# Patient Record
Sex: FEMALE | Race: WHITE | HISPANIC OR LATINO | Employment: UNEMPLOYED | URBAN - METROPOLITAN AREA
[De-identification: names, ages, dates, MRNs, and addresses within clinical notes are randomized per-mention and may not be internally consistent; named-entity substitution may affect disease eponyms.]

---

## 2024-10-24 DIAGNOSIS — S99.911A INJURY OF RIGHT ANKLE: Primary | ICD-10-CM

## 2024-11-22 ENCOUNTER — OFFICE VISIT (OUTPATIENT)
Dept: ORTHOPEDICS | Facility: CLINIC | Age: 24
End: 2024-11-22
Payer: MEDICAID

## 2024-11-22 ENCOUNTER — HOSPITAL ENCOUNTER (OUTPATIENT)
Dept: RADIOLOGY | Facility: HOSPITAL | Age: 24
Discharge: HOME | End: 2024-11-22
Payer: MEDICAID

## 2024-11-22 VITALS
TEMPERATURE: 98 F | BODY MASS INDEX: 34.58 KG/M2 | OXYGEN SATURATION: 97 % | RESPIRATION RATE: 18 BRPM | WEIGHT: 215.19 LBS | HEIGHT: 66 IN | DIASTOLIC BLOOD PRESSURE: 68 MMHG | SYSTOLIC BLOOD PRESSURE: 103 MMHG | HEART RATE: 90 BPM

## 2024-11-22 DIAGNOSIS — S99.911A INJURY OF RIGHT ANKLE: ICD-10-CM

## 2024-11-22 DIAGNOSIS — M76.72 PERONEAL TENDINITIS OF LEFT LOWER EXTREMITY: ICD-10-CM

## 2024-11-22 DIAGNOSIS — S93.491A SPRAIN OF ANTERIOR TALOFIBULAR LIGAMENT OF RIGHT ANKLE, INITIAL ENCOUNTER: Primary | ICD-10-CM

## 2024-11-22 PROCEDURE — 99213 OFFICE O/P EST LOW 20 MIN: CPT | Mod: PBBFAC,25

## 2024-11-22 PROCEDURE — 73610 X-RAY EXAM OF ANKLE: CPT | Mod: TC,RT

## 2024-11-22 NOTE — PROGRESS NOTES
"Subjective:    Patient ID: Jay Ignacio is a 24 y.o. female  who presented to [facility] for new visit..      Chief Complaint: Pain of the Right Ankle (Right ankle pain, patient states past 2 days her ankle has been in severe pain. )      History of Present Illness:  Jay Ignacio  presented today with right ankle pain for the past 3 month with increased pain over the last few days. Pain is located inferior to the lateral malleolus, and rated 3/10 at rest, and 10/10 with activity if she steps on it incorrectly. Quality of pain is described as Sharp, Aching, and Throbbing.  Inciting event: injured while running but no specific directional changes, noted the pain after running a short distance . Pain is aggravated by walking/running. She notes that she did do a dance class a few days ago so that may be contributing to the increase pain the last few days. Patient has had no prior ankle problems. Evaluation to date: plain films. Treatment to date: avoidance of activity, bracing, and oral analgesics. Patient was evaluated by her PCP and was in a boot for about 2.5 months with improvement of pain, once out of the boot the pain began again. Expectations for today's visit includes further evaluation.  Occupation: student. PCP is AMBROCIO Mclean.     Ankle/Foot Review of Systems:  Swelling?  no  Instability?  no  Mechanical sx?  no  <30 min AM stiffness? no  Limited ROM? no  Fever/Chills? no       Objective:      Physical Exam:    /68 (BP Location: Right arm, Patient Position: Sitting)   Pulse 90   Temp 98.4 °F (36.9 °C) (Oral)   Resp 18   Ht 5' 6" (1.676 m)   Wt 97.6 kg (215 lb 2.7 oz)   SpO2 97%   BMI 34.73 kg/m²     Appearance:  Normal gait/station  FWB    Soft tissue swelling: Left: no Right: no  Effusion: Left:  Negative Right: Negative  Erythema: Left no Right: no  Ecchymosis: Left: no Right: no  Atrophy: Left: no Right: " no    Palpation:  Ankle/Foot Tenderness: Left: non tender Right:  distal  lateral  malleolus.    Range of motion:  Ankle dorsiflexion (20 degrees):     Left: 20 Right: 20  Ankle Plantar flexion (50 degrees): Left 50 Right: 50  Subtalar inversion (5 degrees): Left: 5 Right 5  Subtalar eversion (5 degrees):  Left: 5 Right 5  Transverse/midtarsal: adduction (20 degrees): Left: 20 Right: 20  Transverse/midtarsal abduction (10 degrees): Left: 10 Right: 10    Strength:  Foot inversion/dorsiflexion (Deep Peroneal N. L4):Left: 5/5  Pain: no Right: 5/5  Pain: no  1st toe Dorsiflexion (Deep Peroneal N. L5): Left: 5/5  Pain: no Right: 5/5  Pain: no  Foot plantarflexion (Tibial N. S1): Left: 5/5  Pain: no Right: 5/5  Pain: no  Foot eversion (superficial peroneal L4-S1): Left: 5/5  Pain: no Right: 5/5  Pain: no      Special Tests:  [place]:  Left: Not performed     Right: Not performed   Anterior drawer: Left: Negative     Right: Negative   Talar tilt: Left: Negative      Right: Negative   External rotation stress (Kleiger's): Left: Negative  Right: Negative  Eversion stress: Left: Negative Right: Negative   Squeeze: Left: Negative  Right: Negative  Heel rise: Left: Not performed Right: Not performed  Too many toes sign: Left: Not performed Right: Not performed      General appearance: NAD  Peripheral pulses: normal bilaterally   Reflexes: Left: normal Right normal   Sensation: normal    Labs:  Last A1c: 5.5     Imaging:   Previous images reviewed.  X-rays ordered and performed today: yes  # of views: 3 Laterality: right  My Interpretation:  no abnormality. No obvious fracture, abnormal joint space widening/narrowing, cortical defect or obvious soft tissue injury.     Assessment:        Encounter Diagnoses   Code Name Primary?    S93.401A Sprain of right ankle, unspecified ligament, initial encounter Yes    M76.72 Peroneal tendinitis of left lower extremity         Plan:     MDM: Prior external referring provider notes  reviewed. Prior external referring provider studies reviewed.   Dx: right ankle sprain, right peroneal tendinitis - acute moderate exacerbation  Treatment Plan: Discussed with patient diagnosis, prognosis, and treatment recommendations. Education provided.  XR reviewed with patient. Discussed beginning PT. Will have the patient follow up in 12 weeks after completing PT to reevaluate.   Imaging: radiological studies ordered and independently reviewed; discussed with patient; pending radiologist interpretation.   Weight Management: is paramount. Recommend at least 10% of total body weight loss if your BMI is 30-34.9. A BMI of <24.9 may provide further relief..   Activity: Activity as tolerated; HEP to include aerobic conditioning and strength training with non-painful activity. ROM/STG exercises. Proper footware; assistive devises to avoid limping.   Therapy: Physical Therapy  Medication: CONTINUE over-the-counter acetaminophen (Tylenol 1000 mg three times per day as needed)  CONTINUE over-the-counter NSAIDs (ibuprofen 200mg three tablets three times a day as needed). Please see your primary care physician for further refills.  RTC: 3 months.      This note is dictated using the M*Modal Fluency Direct word recognition program. There are word recognition mistakes that are occasionally missed on review.     Inés Geiger MD  Sports Medicine Fellow

## 2024-11-25 NOTE — PROGRESS NOTES
Faculty Attestation: Jay Ignacio  was seen in [facility] Discussed with Dr. Geiger at the time of the visit. History of Present Illness, Physical Exam, and Assessment and Plan reviewed.     Treatment plan is reasonable and appropriate. Compliance with treatment recommendations is important.      Radiology images independently reviewed and agree with fellow interpretation.     No procedure was performed.    Michael Hutchinson MD  Sports Medicine